# Patient Record
Sex: MALE | NOT HISPANIC OR LATINO | Employment: FULL TIME | URBAN - METROPOLITAN AREA
[De-identification: names, ages, dates, MRNs, and addresses within clinical notes are randomized per-mention and may not be internally consistent; named-entity substitution may affect disease eponyms.]

---

## 2017-12-15 ENCOUNTER — OFFICE VISIT (OUTPATIENT)
Dept: FAMILY MEDICINE CLINIC | Facility: CLINIC | Age: 37
End: 2017-12-15

## 2017-12-15 VITALS
WEIGHT: 158.8 LBS | RESPIRATION RATE: 18 BRPM | SYSTOLIC BLOOD PRESSURE: 112 MMHG | BODY MASS INDEX: 22.73 KG/M2 | DIASTOLIC BLOOD PRESSURE: 76 MMHG | HEART RATE: 74 BPM | OXYGEN SATURATION: 99 % | TEMPERATURE: 98.4 F | HEIGHT: 70 IN

## 2017-12-15 DIAGNOSIS — R33.9 URINARY RETENTION: ICD-10-CM

## 2017-12-15 DIAGNOSIS — Z00.00 HEALTHCARE MAINTENANCE: Primary | ICD-10-CM

## 2017-12-15 LAB
ALBUMIN SERPL-MCNC: 4.6 G/DL (ref 3.2–4.8)
ALBUMIN/GLOB SERPL: 1.5 G/DL (ref 1.5–2.5)
ALP SERPL-CCNC: 89 U/L (ref 25–100)
ALT SERPL W P-5'-P-CCNC: 13 U/L (ref 7–40)
ANION GAP SERPL CALCULATED.3IONS-SCNC: 11 MMOL/L (ref 3–11)
ARTICHOKE IGE QN: 93 MG/DL (ref 0–130)
AST SERPL-CCNC: 20 U/L (ref 0–33)
BASOPHILS # BLD AUTO: 0.05 10*3/MM3 (ref 0–0.2)
BASOPHILS NFR BLD AUTO: 0.9 % (ref 0–1)
BILIRUB BLD-MCNC: NEGATIVE MG/DL
BILIRUB SERPL-MCNC: 0.4 MG/DL (ref 0.3–1.2)
BUN BLD-MCNC: 16 MG/DL (ref 9–23)
BUN/CREAT SERPL: 22.9 (ref 7–25)
CALCIUM SPEC-SCNC: 9.9 MG/DL (ref 8.7–10.4)
CHLORIDE SERPL-SCNC: 100 MMOL/L (ref 99–109)
CHOLEST SERPL-MCNC: 138 MG/DL (ref 0–200)
CLARITY, POC: CLEAR
CO2 SERPL-SCNC: 30 MMOL/L (ref 20–31)
COLOR UR: YELLOW
CREAT BLD-MCNC: 0.7 MG/DL (ref 0.6–1.3)
CRP SERPL-MCNC: 0.04 MG/DL (ref 0–1)
DEPRECATED RDW RBC AUTO: 44.8 FL (ref 37–54)
EOSINOPHIL # BLD AUTO: 0.04 10*3/MM3 (ref 0–0.3)
EOSINOPHIL NFR BLD AUTO: 0.7 % (ref 0–3)
ERYTHROCYTE [DISTWIDTH] IN BLOOD BY AUTOMATED COUNT: 13.2 % (ref 11.3–14.5)
EXPIRATION DATE: ABNORMAL
GFR SERPL CREATININE-BSD FRML MDRD: 128 ML/MIN/1.73
GFR SERPL CREATININE-BSD FRML MDRD: >150 ML/MIN/1.73
GLOBULIN UR ELPH-MCNC: 3.1 GM/DL
GLUCOSE BLD-MCNC: 66 MG/DL (ref 70–100)
GLUCOSE UR STRIP-MCNC: NEGATIVE MG/DL
HCT VFR BLD AUTO: 41.3 % (ref 38.9–50.9)
HDLC SERPL-MCNC: 37 MG/DL (ref 40–60)
HGB BLD-MCNC: 13.4 G/DL (ref 13.1–17.5)
HIV1+2 AB SER QL: NORMAL
IMM GRANULOCYTES # BLD: 0.01 10*3/MM3 (ref 0–0.03)
IMM GRANULOCYTES NFR BLD: 0.2 % (ref 0–0.6)
KETONES UR QL: NEGATIVE
LEUKOCYTE EST, POC: ABNORMAL
LYMPHOCYTES # BLD AUTO: 2 10*3/MM3 (ref 0.6–4.8)
LYMPHOCYTES NFR BLD AUTO: 35.7 % (ref 24–44)
Lab: ABNORMAL
MCH RBC QN AUTO: 29.9 PG (ref 27–31)
MCHC RBC AUTO-ENTMCNC: 32.4 G/DL (ref 32–36)
MCV RBC AUTO: 92.2 FL (ref 80–99)
MONOCYTES # BLD AUTO: 0.46 10*3/MM3 (ref 0–1)
MONOCYTES NFR BLD AUTO: 8.2 % (ref 0–12)
NEUTROPHILS # BLD AUTO: 3.05 10*3/MM3 (ref 1.5–8.3)
NEUTROPHILS NFR BLD AUTO: 54.3 % (ref 41–71)
NITRITE UR-MCNC: POSITIVE MG/ML
PH UR: 6 [PH] (ref 5–8)
PLATELET # BLD AUTO: 214 10*3/MM3 (ref 150–450)
PMV BLD AUTO: 12.2 FL (ref 6–12)
POTASSIUM BLD-SCNC: 4.2 MMOL/L (ref 3.5–5.5)
PROT SERPL-MCNC: 7.7 G/DL (ref 5.7–8.2)
PROT UR STRIP-MCNC: NEGATIVE MG/DL
RBC # BLD AUTO: 4.48 10*6/MM3 (ref 4.2–5.76)
RBC # UR STRIP: ABNORMAL /UL
SODIUM BLD-SCNC: 141 MMOL/L (ref 132–146)
SP GR UR: 1.02 (ref 1–1.03)
TRIGL SERPL-MCNC: 197 MG/DL (ref 0–150)
TSH SERPL DL<=0.05 MIU/L-ACNC: 1.65 MIU/ML (ref 0.35–5.35)
URATE SERPL-MCNC: 4.9 MG/DL (ref 3.7–9.2)
UROBILINOGEN UR QL: NORMAL
WBC NRBC COR # BLD: 5.61 10*3/MM3 (ref 3.5–10.8)

## 2017-12-15 PROCEDURE — 85025 COMPLETE CBC W/AUTO DIFF WBC: CPT | Performed by: FAMILY MEDICINE

## 2017-12-15 PROCEDURE — 90715 TDAP VACCINE 7 YRS/> IM: CPT | Performed by: FAMILY MEDICINE

## 2017-12-15 PROCEDURE — 84443 ASSAY THYROID STIM HORMONE: CPT | Performed by: FAMILY MEDICINE

## 2017-12-15 PROCEDURE — 90471 IMMUNIZATION ADMIN: CPT | Performed by: FAMILY MEDICINE

## 2017-12-15 PROCEDURE — 84550 ASSAY OF BLOOD/URIC ACID: CPT | Performed by: FAMILY MEDICINE

## 2017-12-15 PROCEDURE — 81003 URINALYSIS AUTO W/O SCOPE: CPT | Performed by: FAMILY MEDICINE

## 2017-12-15 PROCEDURE — 86140 C-REACTIVE PROTEIN: CPT | Performed by: FAMILY MEDICINE

## 2017-12-15 PROCEDURE — 90686 IIV4 VACC NO PRSV 0.5 ML IM: CPT | Performed by: FAMILY MEDICINE

## 2017-12-15 PROCEDURE — 80053 COMPREHEN METABOLIC PANEL: CPT | Performed by: FAMILY MEDICINE

## 2017-12-15 PROCEDURE — 90472 IMMUNIZATION ADMIN EACH ADD: CPT | Performed by: FAMILY MEDICINE

## 2017-12-15 PROCEDURE — G0432 EIA HIV-1/HIV-2 SCREEN: HCPCS | Performed by: FAMILY MEDICINE

## 2017-12-15 PROCEDURE — 99395 PREV VISIT EST AGE 18-39: CPT | Performed by: FAMILY MEDICINE

## 2017-12-15 PROCEDURE — 80061 LIPID PANEL: CPT | Performed by: FAMILY MEDICINE

## 2017-12-15 NOTE — PROGRESS NOTES
Subjective   Licha Dooley is a 37 y.o. male.     History of Present Illness   He is here for his annual fasting wellness evaluation.  He is amendable to updating his immunizations.  He is requesting another referral to a local Urologist.    Review of Systems   Constitutional: Negative.    HENT: Negative.    Eyes: Negative.    Respiratory: Negative.    Cardiovascular: Negative.    Gastrointestinal: Negative.    Endocrine: Negative.    Genitourinary: Positive for difficulty urinating.   Musculoskeletal: Negative.    Skin: Negative.    Allergic/Immunologic: Negative.    Neurological: Negative.    Hematological: Negative.    Psychiatric/Behavioral: Negative.        Objective   Physical Exam   Constitutional: He is oriented to person, place, and time. He appears well-developed and well-nourished. He is cooperative.   HENT:   Head: Normocephalic and atraumatic.   Right Ear: Hearing and external ear normal.   Left Ear: Hearing and external ear normal.   Nose: Nose normal.   Mouth/Throat: Uvula is midline, oropharynx is clear and moist and mucous membranes are normal.   Eyes: Conjunctivae and EOM are normal. Pupils are equal, round, and reactive to light. No scleral icterus.   Neck: Trachea normal and normal range of motion. Neck supple. No JVD present. Carotid bruit is not present. No thyromegaly present.   Cardiovascular: Normal rate, regular rhythm, normal heart sounds and intact distal pulses.    Pulmonary/Chest: Effort normal and breath sounds normal.   Abdominal: Soft. Bowel sounds are normal. There is no hepatosplenomegaly. There is no tenderness.   Musculoskeletal: Normal range of motion.   Lymphadenopathy:     He has no cervical adenopathy.   Neurological: He is alert and oriented to person, place, and time. He has normal strength and normal reflexes. No sensory deficit. Gait normal.   Skin: Skin is warm and dry.   Psychiatric: He has a normal mood and affect. His speech is normal and behavior is normal.  Judgment and thought content normal. Cognition and memory are normal.   Nursing note and vitals reviewed.      Assessment/Plan   Diagnoses and all orders for this visit:    Healthcare maintenance  -     POC Urinalysis Dipstick, Automated  -     Comprehensive Metabolic Panel  -     CBC & Differential  -     Lipid Panel  -     TSH  -     Uric Acid  -     C-reactive Protein  -     HIV-1 / O / 2 Ag / Antibody 4th Generation  -     Tdap Vaccine Greater Than or Equal To 8yo IM  -     Flu Vaccine Quad PF 3YR+  - Vaccine counseling and current VIS is provided for immunizations administered today.    Urinary retention  -     Ambulatory Referral to Urology    The patient is here for a health maintenance visit.  Currently, the patient consumes a healthy diet and has an adequate exercise regimen. Screening lab work is ordered.  Immunizations are updated today.  Advice and education is given regarding nutrition, aerobic exercise, routine dental evaluations, routine eye exams, reproductive health, cardiovascular risk reduction, sunscreen use, self skin examination (annual dermatology evaluations) and seat belt use (general overall safety).  Further recommendations after lab evaluation.  Annual wellness evaluations recommended.

## 2018-06-19 ENCOUNTER — TELEPHONE (OUTPATIENT)
Dept: FAMILY MEDICINE CLINIC | Facility: CLINIC | Age: 38
End: 2018-06-19

## 2018-06-19 NOTE — TELEPHONE ENCOUNTER
----- Message from Eveline Blount Rep sent at 6/19/2018 11:45 AM EDT -----  Regarding: ER FU-UTI  Contact: [phone]  PT WAS SEEN IN THE ER FOR A UTI. HE WAS GIVEN AN ANTIBIOTIC. THE ER CALLED HIM BACK AFTER GETTING HIS URINE CULTURE BACK AND THEY CHANGED HIS ANTIBIOTIC. HE HAS BEEN ON THE NEW RX FOR A COUPLE OF DAYS AND WANTS TO KNOW IF HE SHOULD FOLLOW UP WHILE STILL ON HIS RX OR IF HE SHOULD WAIT TO COME IN?

## 2018-07-09 ENCOUNTER — OFFICE VISIT (OUTPATIENT)
Dept: FAMILY MEDICINE CLINIC | Facility: CLINIC | Age: 38
End: 2018-07-09

## 2018-07-09 VITALS
RESPIRATION RATE: 16 BRPM | TEMPERATURE: 98.3 F | DIASTOLIC BLOOD PRESSURE: 84 MMHG | HEART RATE: 67 BPM | HEIGHT: 70 IN | WEIGHT: 163 LBS | SYSTOLIC BLOOD PRESSURE: 122 MMHG | OXYGEN SATURATION: 98 % | BODY MASS INDEX: 23.34 KG/M2

## 2018-07-09 DIAGNOSIS — R31.9 HEMATURIA, UNSPECIFIED TYPE: ICD-10-CM

## 2018-07-09 DIAGNOSIS — R30.0 DYSURIA: Primary | ICD-10-CM

## 2018-07-09 DIAGNOSIS — R10.9 FLANK PAIN, ACUTE: ICD-10-CM

## 2018-07-09 PROBLEM — N31.9 NEUROGENIC BLADDER: Status: ACTIVE | Noted: 2018-07-09

## 2018-07-09 LAB
BILIRUB BLD-MCNC: NEGATIVE MG/DL
CLARITY, POC: CLEAR
COLOR UR: YELLOW
GLUCOSE UR STRIP-MCNC: NEGATIVE MG/DL
HCT VFR BLDA CALC: 38.1 %
HGB BLDA-MCNC: 12.7 G/DL
KETONES UR QL: NEGATIVE
LEUKOCYTE EST, POC: NEGATIVE
MCH, POC: 30.1
MCHC, POC: 33.3
MCV, POC: 90.4
NITRITE UR-MCNC: NEGATIVE MG/ML
PH UR: 5.5 [PH] (ref 5–8)
PLATELET # BLD: 190 10*3/MM3
PMV BLD: 9 FL
PROT UR STRIP-MCNC: NEGATIVE MG/DL
RBC # UR STRIP: ABNORMAL /UL
RBC, POC: 4.22
RDW, POC: 12.8
SP GR UR: 1.02 (ref 1–1.03)
UROBILINOGEN UR QL: NORMAL
WBC # BLD: 6.4 10*3/UL

## 2018-07-09 PROCEDURE — 87185 SC STD ENZYME DETCJ PER NZM: CPT | Performed by: NURSE PRACTITIONER

## 2018-07-09 PROCEDURE — 87086 URINE CULTURE/COLONY COUNT: CPT | Performed by: NURSE PRACTITIONER

## 2018-07-09 PROCEDURE — 99214 OFFICE O/P EST MOD 30 MIN: CPT | Performed by: NURSE PRACTITIONER

## 2018-07-09 PROCEDURE — 85025 COMPLETE CBC W/AUTO DIFF WBC: CPT | Performed by: NURSE PRACTITIONER

## 2018-07-09 PROCEDURE — 87077 CULTURE AEROBIC IDENTIFY: CPT | Performed by: NURSE PRACTITIONER

## 2018-07-09 PROCEDURE — 87186 SC STD MICRODIL/AGAR DIL: CPT | Performed by: NURSE PRACTITIONER

## 2018-07-09 PROCEDURE — 87147 CULTURE TYPE IMMUNOLOGIC: CPT | Performed by: NURSE PRACTITIONER

## 2018-07-09 RX ORDER — LEVOFLOXACIN 500 MG/1
500 TABLET, FILM COATED ORAL DAILY
Qty: 7 TABLET | Refills: 0 | Status: SHIPPED | OUTPATIENT
Start: 2018-07-09 | End: 2018-07-16

## 2018-07-09 NOTE — PATIENT INSTRUCTIONS
Flank Pain, Adult  Flank pain is pain that is located on the side of the body between the upper abdomen and the back. This area is called the flank. The pain may occur over a short period of time (acute), or it may be long-term or recurring (chronic). It may be mild or severe. Flank pain can be caused by many things, including:  · Muscle soreness or injury.  · Kidney stones or kidney disease.  · Stress.  · A disease of the spine (vertebral disk disease).  · A lung infection (pneumonia).  · Fluid around the lungs (pulmonary edema).  · A skin rash caused by the chickenpox virus (shingles).  · Tumors that affect the back of the abdomen.  · Gallbladder disease.    Follow these instructions at home:  · Drink enough fluid to keep your urine clear or pale yellow.  · Rest as told by your health care provider.  · Take over-the-counter and prescription medicines only as told by your health care provider.  · Keep a journal to track what has caused your flank pain and what has made it feel better.  · Keep all follow-up visits as told by your health care provider. This is important.  Contact a health care provider if:  · Your pain is not controlled with medicine.  · You have new symptoms.  · Your pain gets worse.  · You have a fever.  · Your symptoms last longer than 2-3 days.  · You have trouble urinating or you are urinating very frequently.  Get help right away if:  · You have trouble breathing or you are short of breath.  · Your abdomen hurts or it is swollen or red.  · You have nausea or vomiting.  · You feel faint or you pass out.  · You have blood in your urine.  Summary  · Flank pain is pain that is located on the side of the body between the upper abdomen and the back.  · The pain may occur over a short period of time (acute), or it may be long-term or recurring (chronic). It may be mild or severe.  · Flank pain can be caused by many things.  · Contact your health care provider if your symptoms get worse or they last  longer than 2-3 days.  This information is not intended to replace advice given to you by your health care provider. Make sure you discuss any questions you have with your health care provider.  Document Released: 02/08/2007 Document Revised: 03/02/2018 Document Reviewed: 03/02/2018  [employer] Interactive Patient Education © 2018 [employer]  Dysuria  Dysuria is pain or discomfort while urinating. The pain or discomfort may be felt in the tube that carries urine out of the bladder (urethra) or in the surrounding tissue of the genitals. The pain may also be felt in the groin area, lower abdomen, and lower back. You may have to urinate frequently or have the sudden feeling that you have to urinate (urgency). Dysuria can affect both men and women, but is more common in women.  Dysuria can be caused by many different things, including:  · Urinary tract infection in women.  · Infection of the kidney or bladder.  · Kidney stones or bladder stones.  · Certain sexually transmitted infections (STIs), such as chlamydia.  · Dehydration.  · Inflammation of the vagina.  · Use of certain medicines.  · Use of certain soaps or scented products that cause irritation.    Follow these instructions at home:  Watch your dysuria for any changes. The following actions may help to reduce any discomfort you are feeling:  · Drink enough fluid to keep your urine clear or pale yellow.  · Empty your bladder often. Avoid holding urine for long periods of time.  · After a bowel movement or urination, women should cleanse from front to back, using each tissue only once.  · Empty your bladder after sexual intercourse.  · Take medicines only as directed by your health care provider.  · If you were prescribed an antibiotic medicine, finish it all even if you start to feel better.  · Avoid caffeine, tea, and alcohol. They can irritate the bladder and make dysuria worse. In men, alcohol may irritate the prostate.  · Keep all follow-up visits as  directed by your health care provider. This is important.  · If you had any tests done to find the cause of dysuria, it is your responsibility to obtain your test results. Ask the lab or department performing the test when and how you will get your results. Talk with your health care provider if you have any questions about your results.    Contact a health care provider if:  · You develop pain in your back or sides.  · You have a fever.  · You have nausea or vomiting.  · You have blood in your urine.  · You are not urinating as often as you usually do.  Get help right away if:  · You pain is severe and not relieved with medicines.  · You are unable to hold down any fluids.  · You or someone else notices a change in your mental function.  · You have a rapid heartbeat at rest.  · You have shaking or chills.  · You feel extremely weak.  This information is not intended to replace advice given to you by your health care provider. Make sure you discuss any questions you have with your health care provider.  Document Released: 09/15/2005 Document Revised: 05/25/2017 Document Reviewed: 08/13/2015  [employer] Interactive Patient Education © 2018 [employer]    Hematuria, Adult  Hematuria is blood in your urine. It can be caused by a bladder infection, kidney infection, prostate infection, kidney stone, or cancer of your urinary tract. Infections can usually be treated with medicine, and a kidney stone usually will pass through your urine. If neither of these is the cause of your hematuria, further workup to find out the reason may be needed.  It is very important that you tell your health care provider about any blood you see in your urine, even if the blood stops without treatment or happens without causing pain. Blood in your urine that happens and then stops and then happens again can be a symptom of a very serious condition. Also, pain is not a symptom in the initial stages of many urinary cancers.  Follow these  instructions at home:  · Drink lots of fluid, 3-4 quarts a day. If you have been diagnosed with an infection, cranberry juice is especially recommended, in addition to large amounts of water.  · Avoid caffeine, tea, and carbonated beverages because they tend to irritate the bladder.  · Avoid alcohol because it may irritate the prostate.  · Take all medicines as directed by your health care provider.  · If you were prescribed an antibiotic medicine, finish it all even if you start to feel better.  · If you have been diagnosed with a kidney stone, follow your health care provider's instructions regarding straining your urine to catch the stone.  · Empty your bladder often. Avoid holding urine for long periods of time.  · After a bowel movement, women should cleanse front to back. Use each tissue only once.  · Empty your bladder before and after sexual intercourse if you are a female.  Contact a health care provider if:  · You develop back pain.  · You have a fever.  · You have a feeling of sickness in your stomach (nausea) or vomiting.  · Your symptoms are not better in 3 days. Return sooner if you are getting worse.  Get help right away if:  · You develop severe vomiting and are unable to keep the medicine down.  · You develop severe back or abdominal pain despite taking your medicines.  · You begin passing a large amount of blood or clots in your urine.  · You feel extremely weak or faint, or you pass out.  This information is not intended to replace advice given to you by your health care provider. Make sure you discuss any questions you have with your health care provider.  Document Released: 12/18/2006 Document Revised: 05/25/2017 Document Reviewed: 08/18/2014  [employer] Interactive Patient Education © 2017 [employer]    Urinary Tract Infection, Adult  A urinary tract infection (UTI) is an infection of any part of the urinary tract, which includes the kidneys, ureters, bladder, and urethra. These organs make,  store, and get rid of urine in the body. UTI can be a bladder infection (cystitis) or kidney infection (pyelonephritis).  What are the causes?  This infection may be caused by fungi, viruses, or bacteria. Bacteria are the most common cause of UTIs. This condition can also be caused by repeated incomplete emptying of the bladder during urination.  What increases the risk?  This condition is more likely to develop if:  · You ignore your need to urinate or hold urine for long periods of time.  · You do not empty your bladder completely during urination.  · You wipe back to front after urinating or having a bowel movement, if you are female.  · You are uncircumcised, if you are male.  · You are constipated.  · You have a urinary catheter that stays in place (indwelling).  · You have a weak defense (immune) system.  · You have a medical condition that affects your bowels, kidneys, or bladder.  · You have diabetes.  · You take antibiotic medicines frequently or for long periods of time, and the antibiotics no longer work well against certain types of infections (antibiotic resistance).  · You take medicines that irritate your urinary tract.  · You are exposed to chemicals that irritate your urinary tract.  · You are female.    What are the signs or symptoms?  Symptoms of this condition include:  · Fever.  · Frequent urination or passing small amounts of urine frequently.  · Needing to urinate urgently.  · Pain or burning with urination.  · Urine that smells bad or unusual.  · Cloudy urine.  · Pain in the lower abdomen or back.  · Trouble urinating.  · Blood in the urine.  · Vomiting or being less hungry than normal.  · Diarrhea or abdominal pain.  · Vaginal discharge, if you are female.    How is this diagnosed?  This condition is diagnosed with a medical history and physical exam. You will also need to provide a urine sample to test your urine. Other tests may be done, including:  · Blood tests.  · Sexually transmitted  disease (STD) testing.    If you have had more than one UTI, a cystoscopy or imaging studies may be done to determine the cause of the infections.  How is this treated?  Treatment for this condition often includes a combination of two or more of the following:  · Antibiotic medicine.  · Other medicines to treat less common causes of UTI.  · Over-the-counter medicines to treat pain.  · Drinking enough water to stay hydrated.    Follow these instructions at home:  · Take over-the-counter and prescription medicines only as told by your health care provider.  · If you were prescribed an antibiotic, take it as told by your health care provider. Do not stop taking the antibiotic even if you start to feel better.  · Avoid alcohol, caffeine, tea, and carbonated beverages. They can irritate your bladder.  · Drink enough fluid to keep your urine clear or pale yellow.  · Keep all follow-up visits as told by your health care provider. This is important.  · Make sure to:  ? Empty your bladder often and completely. Do not hold urine for long periods of time.  ? Empty your bladder before and after sex.  ? Wipe from front to back after a bowel movement if you are female. Use each tissue one time when you wipe.  Contact a health care provider if:  · You have back pain.  · You have a fever.  · You feel nauseous or vomit.  · Your symptoms do not get better after 3 days.  · Your symptoms go away and then return.  Get help right away if:  · You have severe back pain or lower abdominal pain.  · You are vomiting and cannot keep down any medicines or water.  This information is not intended to replace advice given to you by your health care provider. Make sure you discuss any questions you have with your health care provider.  Document Released: 09/27/2006 Document Revised: 05/31/2017 Document Reviewed: 11/07/2016  [employer] Interactive Patient Education © 2018 [employer]  Levofloxacin tablets  What is this medicine?  LEVOFLOXACIN (hadley  voe FLOX a sin) is a quinolone antibiotic. It is used to treat certain kinds of bacterial infections. It will not work for colds, flu, or other viral infections.  This medicine may be used for other purposes; ask your health care provider or pharmacist if you have questions.  COMMON BRAND NAME(S): Levaquin, Levaquin Leva-Javier  What should I tell my health care provider before I take this medicine?  They need to know if you have any of these conditions:  -bone problems  -diabetes  -history of low levels of potassium in the blood  -irregular heartbeat  -joint problems  -kidney disease  -liver disease  -myasthenia gravis  -seizures  -tendon problems  -tingling of the fingers or toes, or other nerve disorder  -an unusual or allergic reaction to levofloxacin, other quinolone antibiotics, foods, dyes, or preservatives  -pregnant or trying to get pregnant  -breast-feeding  How should I use this medicine?  Take this medicine by mouth with a full glass of water. Follow the directions on the prescription label. This medicine can be taken with or without food. Take your medicine at regular intervals. Do not take your medicine more often than directed. Do not skip doses or stop your medicine early even if you feel better. Do not stop taking except on your doctor's advice.  A special MedGuide will be given to you by the pharmacist with each prescription and refill. Be sure to read this information carefully each time.  Talk to your pediatrician regarding the use of this medicine in children. While this drug may be prescribed for children as young as 6 months for selected conditions, precautions do apply.  Overdosage: If you think you have taken too much of this medicine contact a poison control center or emergency room at once.  NOTE: This medicine is only for you. Do not share this medicine with others.  What if I miss a dose?  If you miss a dose, take it as soon as you remember. If it is almost time for your next dose, take  only that dose. Do not take double or extra doses.  What may interact with this medicine?  Do not take this medicine with any of the following medications:  -bepridil  -certain medicines for depression, anxiety, or psychotic disturbances like pimozide, thioridazine, and ziprasidone  -certain medicines for irregular heart beat like dofetilide and dronedarone  -cisapride  -halofantrine  This medicine may also interact with the following medications:  -antacids  -birth control pills  -certain medicines for diabetes, like glipizide, glyburide, or insulin  -didanosine buffered tablets or powder  -multivitamins  -NSAIDS, medicines for pain and inflammation, like ibuprofen or naproxen  -steroid medicines like prednisone or cortisone  -sucralfate  -theophylline  -warfarin  This list may not describe all possible interactions. Give your health care provider a list of all the medicines, herbs, non-prescription drugs, or dietary supplements you use. Also tell them if you smoke, drink alcohol, or use illegal drugs. Some items may interact with your medicine.  What should I watch for while using this medicine?  Tell your doctor or healthcare professional if your symptoms do not start to get better or if they get worse.  Do not treat diarrhea with over the counter products. Contact your doctor if you have diarrhea that lasts more than 2 days or if it is severe and watery.  Check with your doctor or health care professional if you get an attack of severe diarrhea, nausea and vomiting, or if you sweat a lot. The loss of too much body fluid can make it dangerous for you to take this medicine.  You may get drowsy or dizzy. Do not drive, use machinery, or do anything that needs mental alertness until you know how this medicine affects you. Do not sit or stand up quickly, especially if you are an older patient. This reduces the risk of dizzy or fainting spells.  This medicine can make you more sensitive to the sun. Keep out of the sun.  If you cannot avoid being in the sun, wear protective clothing and use a sunscreen. Do not use sun lamps or tanning beds/booths. Contact your doctor if you get a sunburn.  If you are a diabetic monitor your blood glucose carefully. If you get an unusual reading stop taking this medicine and call your doctor right away.  Avoid antacids, calcium, iron, and zinc products for 2 hours before and 2 hours after taking a dose of this medicine.  What side effects may I notice from receiving this medicine?  Side effects that you should report to your doctor or health care professional as soon as possible:  -allergic reactions like skin rash or hives, swelling of the face, lips, or tongue  -anxious  -breathing problems  -confusion  -depressed mood  -diarrhea  -dizziness  -fast, irregular heartbeat  -hallucination, loss of contact with reality  -joint, muscle, or tendon pain or swelling  -muscle weakness  -pain, tingling, numbness in the hands or feet  -seizures  -signs and symptoms of high blood sugar such as dizziness; dry mouth; dry skin; fruity breath; nausea; stomach pain; increased hunger or thirst; increased urination  -signs and symptoms of liver injury like dark yellow or brown urine; general ill feeling or flu-like symptoms; light-colored stools; loss of appetite; nausea; right upper belly pain; unusually weak or tired; yellowing of the eyes or skin  -signs and symptoms of low blood sugar such as feeling anxious; confusion; dizziness; increased hunger; unusually weak or tired; sweating; shakiness; cold; irritable; headache; blurred vision; fast heartbeat; loss of consciousness  -suicidal thoughts or other mood changes  -sunburn  -unusually weak or tired  Side effects that usually do not require medical attention (report to your doctor or health care professional if they continue or are bothersome):  -constipation  -dry mouth  -headache  -nausea, vomiting  -trouble sleeping  This list may not describe all possible side  effects. Call your doctor for medical advice about side effects. You may report side effects to FDA at 1-200-HHN-3447.  Where should I keep my medicine?  Keep out of the reach of children.  Store at room temperature between 15 and 30 degrees C (59 and 86 degrees F). Keep in a tightly closed container. Throw away any unused medicine after the expiration date.  NOTE: This sheet is a summary. It may not cover all possible information. If you have questions about this medicine, talk to your doctor, pharmacist, or health care provider.  © 2018 [employer] (2017-06-27 12:38:27)

## 2018-07-11 NOTE — PROGRESS NOTES
Subjective   Licha Dooley is a 38 y.o. male.     Difficulty Urinating   This is a new problem. The current episode started yesterday. The problem occurs daily. The problem has been gradually worsening. Associated symptoms include urinary symptoms. Pertinent negatives include no abdominal pain, anorexia, arthralgias, change in bowel habit, chest pain, chills, diaphoresis, fever, myalgias, nausea, rash or vomiting. Exacerbated by: Patient self-catheterizes due to neurogenic bladder. Treatments tried: Patient states that he has recently been treated for UTI's with two different antibiotics in the past 3 weeks ( ER)-not certain of exact names of medications, thinks may be Bactrim and Macrobid. The treatment provided mild relief.       The following portions of the patient's history were reviewed and updated as appropriate: allergies, current medications, past family history, past medical history, past social history, past surgical history and problem list.     No Known Allergies     Review of Systems   Constitutional: Negative for chills, diaphoresis and fever.   Respiratory: Negative.    Cardiovascular: Negative.  Negative for chest pain.   Gastrointestinal: Negative for abdominal pain, anorexia, change in bowel habit, diarrhea, nausea and vomiting.   Genitourinary: Positive for difficulty urinating, dysuria and flank pain (right). Negative for hematuria, penile pain and testicular pain.   Musculoskeletal: Negative for arthralgias and myalgias.   Skin: Negative for rash.   Hematological: Negative.    Psychiatric/Behavioral: Negative.        Objective   Physical Exam   Constitutional: He is oriented to person, place, and time. Vital signs are normal. He appears well-developed and well-nourished. He is cooperative. No distress.   HENT:   Mouth/Throat: Uvula is midline and mucous membranes are normal.   Cardiovascular: Normal rate, regular rhythm and normal heart sounds.    Pulmonary/Chest: Effort normal and  breath sounds normal.   Abdominal: Soft. Normal appearance. There is no tenderness. There is no rigidity, no rebound, no guarding and no CVA tenderness.   Neurological: He is alert and oriented to person, place, and time.   Skin: Skin is warm, dry and intact. No rash noted. He is not diaphoretic.   Psychiatric: He has a normal mood and affect. His behavior is normal. Judgment and thought content normal.   Vitals reviewed.    Vitals:    07/09/18 1608   BP: 122/84   Pulse: 67   Resp: 16   Temp: 98.3 °F (36.8 °C)   SpO2: 98%   Body mass index is 23.39 kg/m².     Assessment/Plan   Licha was seen today for urinary tract infection.    Diagnoses and all orders for this visit:    Dysuria  -     POCT urinalysis dipstick, automated  -     POC CBC With / Auto Diff  -     levoFLOXacin (LEVAQUIN) 500 MG tablet; Take 1 tablet by mouth Daily for 7 days.  -     Urine Culture - Urine, Urine, Clean Catch    Hematuria, unspecified type  -     levoFLOXacin (LEVAQUIN) 500 MG tablet; Take 1 tablet by mouth Daily for 7 days.  -     Urine Culture - Urine, Urine, Clean Catch    Flank pain, acute  -     levoFLOXacin (LEVAQUIN) 500 MG tablet; Take 1 tablet by mouth Daily for 7 days.  -     Urine Culture - Urine, Urine, Clean Catch       Brief Urine Lab Results  (Last result in the past 365 days)      Color   Clarity   Blood   Leuk Est   Nitrite   Protein   CREAT   Urine HCG        07/09/18 1631 Yellow Clear Trace(A) Negative Negative Negative             POC WBC 6.4    Keep upcoming appointment with  Urology.  Discussed the nature of the medical condition(s) risks, complications, implications, management, safe and proper use of medications. Encouraged medication compliance, and keeping scheduled follow up appointments with me and any other providers.

## 2018-07-13 LAB — BACTERIA SPEC AEROBE CULT: ABNORMAL

## 2018-07-14 ENCOUNTER — TELEPHONE (OUTPATIENT)
Dept: FAMILY MEDICINE CLINIC | Facility: CLINIC | Age: 38
End: 2018-07-14

## 2018-07-15 ENCOUNTER — TELEPHONE (OUTPATIENT)
Dept: FAMILY MEDICINE CLINIC | Facility: CLINIC | Age: 38
End: 2018-07-15

## 2018-07-15 NOTE — TELEPHONE ENCOUNTER
Spoke with patient and informed of urine culture results-organism is susceptible to Levaquin patient prescribed.

## 2018-07-25 ENCOUNTER — OFFICE VISIT (OUTPATIENT)
Dept: FAMILY MEDICINE CLINIC | Facility: CLINIC | Age: 38
End: 2018-07-25

## 2018-07-25 VITALS
OXYGEN SATURATION: 98 % | TEMPERATURE: 98.3 F | DIASTOLIC BLOOD PRESSURE: 62 MMHG | WEIGHT: 164 LBS | HEIGHT: 70 IN | RESPIRATION RATE: 16 BRPM | SYSTOLIC BLOOD PRESSURE: 116 MMHG | BODY MASS INDEX: 23.48 KG/M2 | HEART RATE: 61 BPM

## 2018-07-25 DIAGNOSIS — N31.9 NEUROGENIC BLADDER: ICD-10-CM

## 2018-07-25 DIAGNOSIS — N39.0 ACUTE UTI: Primary | ICD-10-CM

## 2018-07-25 LAB
BILIRUB BLD-MCNC: NEGATIVE MG/DL
CLARITY, POC: CLEAR
COLOR UR: YELLOW
GLUCOSE UR STRIP-MCNC: NEGATIVE MG/DL
KETONES UR QL: NEGATIVE
LEUKOCYTE EST, POC: ABNORMAL
NITRITE UR-MCNC: NEGATIVE MG/ML
PH UR: 7 [PH] (ref 5–8)
PROT UR STRIP-MCNC: NEGATIVE MG/DL
RBC # UR STRIP: NEGATIVE /UL
SP GR UR: 1.03 (ref 1–1.03)
UROBILINOGEN UR QL: NORMAL

## 2018-07-25 PROCEDURE — 99214 OFFICE O/P EST MOD 30 MIN: CPT | Performed by: FAMILY MEDICINE

## 2018-07-25 RX ORDER — SULFAMETHOXAZOLE AND TRIMETHOPRIM 800; 160 MG/1; MG/1
1 TABLET ORAL 2 TIMES DAILY
Qty: 20 TABLET | Refills: 0 | Status: SHIPPED | OUTPATIENT
Start: 2018-07-25 | End: 2018-08-06

## 2018-07-25 RX ORDER — OMEPRAZOLE 20 MG/1
20 CAPSULE, DELAYED RELEASE ORAL DAILY
COMMUNITY

## 2018-07-25 NOTE — PROGRESS NOTES
"Gagan Dooley is a 38 y.o. male.     History of Present Illness   The patient is here today to follow up on UTI and Flank pain.    States he does self craterization 2-3 times a day.  States he has had 3 rd time in 5 weeks he has had a UTI.  Last antibiotic was Levofloxacin 500 mg daily for 7 days.    Saw Urology at  3 months ago.    Denies any chest pain or shortness of breath.    The following portions of the patient's history were reviewed and updated as appropriate: allergies, current medications, past social history and problem list.    Review of Systems   Respiratory: Negative for shortness of breath.    Cardiovascular: Negative for chest pain.   Genitourinary: Positive for flank pain.       Objective   /62   Pulse 61   Temp 98.3 °F (36.8 °C) (Temporal Artery )   Resp 16   Ht 177.8 cm (70\")   Wt 74.4 kg (164 lb)   SpO2 98%   BMI 23.53 kg/m²   Physical Exam   Constitutional: He appears well-developed and well-nourished.   Cardiovascular: Normal rate and regular rhythm.    Pulmonary/Chest: Effort normal and breath sounds normal.   Abdominal: Soft. Bowel sounds are normal. He exhibits no distension. There is no tenderness.   Nursing note and vitals reviewed.      Assessment/Plan   Problem List Items Addressed This Visit        Genitourinary    Neurogenic bladder      Other Visit Diagnoses     Acute UTI    -  Primary    Relevant Orders    POCT urinalysis dipstick, automated (Completed)      The patient has been seen by the urology Department at the [facility].  He needs follow-up with them.  His urine culture from his visit at the last time here revealed sensitivity to trimethoprim and sulfamethoxazole.  We will treat him for a 10 day period with that medication.        Drink plenty fluids.    Continue medications as doing.    Rx for Bactrim DS twice a day for 10 days. #20+0.  Take OTC Probiotic daily while taking the antibiotics.    Follow up as " needed.              Scribed for Dr Juaquin Albrecht by Willa Taylor CMA.          I, Juaquin Albrecht MD, personally performed the services described in this documentation, as scribed by Willa Taylor in my presence, and is both accurate and complete.

## 2018-08-06 ENCOUNTER — OFFICE VISIT (OUTPATIENT)
Dept: FAMILY MEDICINE CLINIC | Facility: CLINIC | Age: 38
End: 2018-08-06

## 2018-08-06 VITALS
HEIGHT: 70 IN | SYSTOLIC BLOOD PRESSURE: 118 MMHG | RESPIRATION RATE: 16 BRPM | OXYGEN SATURATION: 98 % | DIASTOLIC BLOOD PRESSURE: 74 MMHG | WEIGHT: 164.6 LBS | HEART RATE: 67 BPM | BODY MASS INDEX: 23.56 KG/M2

## 2018-08-06 DIAGNOSIS — R39.198 DIFFICULTY URINATING: Primary | ICD-10-CM

## 2018-08-06 LAB
BILIRUB BLD-MCNC: NEGATIVE MG/DL
CLARITY, POC: CLEAR
COLOR UR: YELLOW
GLUCOSE UR STRIP-MCNC: NEGATIVE MG/DL
KETONES UR QL: NEGATIVE
LEUKOCYTE EST, POC: NEGATIVE
NITRITE UR-MCNC: NEGATIVE MG/ML
PH UR: 5 [PH] (ref 5–8)
PROT UR STRIP-MCNC: NEGATIVE MG/DL
RBC # UR STRIP: NEGATIVE /UL
SP GR UR: 1.01 (ref 1–1.03)
UROBILINOGEN UR QL: NORMAL

## 2018-08-06 PROCEDURE — 99213 OFFICE O/P EST LOW 20 MIN: CPT | Performed by: FAMILY MEDICINE

## 2018-08-06 PROCEDURE — 87086 URINE CULTURE/COLONY COUNT: CPT | Performed by: FAMILY MEDICINE

## 2018-08-06 NOTE — PROGRESS NOTES
Subjective   Licha Dooley is a 38 y.o. male.     History of Present Illness   He continues care with his urologist at [facility].  He continues with intermittent self caths.  He describes difficulty with  urinating over the past month.  He is requesting a urine evaluation.  His recent urine culture grew out staph epidermidis.  He was prescribed Levaquin.  He was seen again on July 25 and treated with Bactrim.  He denies fever, chills, flank pain or gross hematuria.    Review of Systems   Constitutional: Negative for chills and fever.   Genitourinary: Positive for difficulty urinating.   Skin: Negative for rash.       Objective   Physical Exam   Constitutional: He is oriented to person, place, and time. He appears well-developed and well-nourished.   HENT:   Head: Normocephalic and atraumatic.   Right Ear: Hearing normal.   Left Ear: Hearing normal.   Mouth/Throat: Mucous membranes are normal.   Eyes: Pupils are equal, round, and reactive to light. Conjunctivae and EOM are normal.   Neck: Neck supple. No JVD present. No thyromegaly present.   Cardiovascular: Normal rate, regular rhythm and normal heart sounds.    Pulmonary/Chest: Effort normal and breath sounds normal.   Musculoskeletal: Normal range of motion.   Neurological: He is alert and oriented to person, place, and time. He has normal strength. No sensory deficit. Gait normal.   Skin: Skin is warm and dry.   Psychiatric: He has a normal mood and affect. His behavior is normal. Judgment and thought content normal. Cognition and memory are normal.   Nursing note and vitals reviewed.      Assessment/Plan   Diagnoses and all orders for this visit:    Difficulty urinating  -     POC Urinalysis Dipstick, Automated  -     Urine Culture - Urine, Clean Catch  - Follow up with urologist  - Further recommendations pending urine culture results.

## 2018-08-08 LAB — BACTERIA SPEC AEROBE CULT: NORMAL

## 2018-10-17 ENCOUNTER — OFFICE VISIT (OUTPATIENT)
Dept: FAMILY MEDICINE CLINIC | Facility: CLINIC | Age: 38
End: 2018-10-17

## 2018-10-17 VITALS
OXYGEN SATURATION: 99 % | DIASTOLIC BLOOD PRESSURE: 72 MMHG | TEMPERATURE: 97.8 F | HEIGHT: 70 IN | HEART RATE: 64 BPM | SYSTOLIC BLOOD PRESSURE: 118 MMHG | RESPIRATION RATE: 17 BRPM | BODY MASS INDEX: 23.13 KG/M2 | WEIGHT: 161.6 LBS

## 2018-10-17 DIAGNOSIS — Z78.9 SELF-CATHETERIZES URINARY BLADDER: ICD-10-CM

## 2018-10-17 DIAGNOSIS — Z23 IMMUNIZATION DUE: ICD-10-CM

## 2018-10-17 DIAGNOSIS — N31.9 NEUROGENIC BLADDER: ICD-10-CM

## 2018-10-17 DIAGNOSIS — R30.0 DYSURIA: Primary | ICD-10-CM

## 2018-10-17 LAB
BILIRUB BLD-MCNC: NEGATIVE MG/DL
CLARITY, POC: CLEAR
COLOR UR: YELLOW
EXPIRATION DATE: NORMAL
GLUCOSE UR STRIP-MCNC: NEGATIVE MG/DL
KETONES UR QL: NEGATIVE
LEUKOCYTE EST, POC: NEGATIVE
Lab: NORMAL
NITRITE UR-MCNC: NEGATIVE MG/ML
PH UR: 7 [PH] (ref 5–8)
PROT UR STRIP-MCNC: NEGATIVE MG/DL
RBC # UR STRIP: NEGATIVE /UL
SP GR UR: 1.01 (ref 1–1.03)
UROBILINOGEN UR QL: NORMAL

## 2018-10-17 PROCEDURE — 90471 IMMUNIZATION ADMIN: CPT | Performed by: NURSE PRACTITIONER

## 2018-10-17 PROCEDURE — 90686 IIV4 VACC NO PRSV 0.5 ML IM: CPT | Performed by: NURSE PRACTITIONER

## 2018-10-17 PROCEDURE — 99213 OFFICE O/P EST LOW 20 MIN: CPT | Performed by: NURSE PRACTITIONER

## 2018-10-17 PROCEDURE — 87086 URINE CULTURE/COLONY COUNT: CPT | Performed by: NURSE PRACTITIONER

## 2018-10-17 NOTE — PROGRESS NOTES
Subjective   Licha Dooley is a 38 y.o. male.     History of Present Illness Patient presents with complaints of right sided low back pain for 1 day. He has a history of uti and this is one of his first symptoms of UTI.  He self caths for neurogenic bladder. Last night had dizziness, flank pain. Denies fever, nausea and vomiting. No hematuria or dysuria. Was able to void a small amount for specimen today.  Also requests flu vacc today.    The following portions of the patient's history were reviewed and updated as appropriate: allergies, current medications, past family history, past medical history, past social history, past surgical history and problem list.    Review of Systems   Constitutional: Negative for appetite change, fever and unexpected weight loss.   HENT: Negative for nosebleeds, sore throat and trouble swallowing.    Eyes: Negative for visual disturbance.   Respiratory: Negative for cough, shortness of breath and wheezing.    Cardiovascular: Negative for chest pain, palpitations and leg swelling.   Gastrointestinal: Negative for abdominal pain, blood in stool, constipation, diarrhea, nausea and vomiting.   Endocrine: Negative for polydipsia, polyphagia and polyuria.   Genitourinary: Positive for flank pain. Negative for urinary incontinence, dysuria, frequency and hematuria.   Musculoskeletal: Negative for arthralgias, gait problem, joint swelling and myalgias.   Skin: Negative for rash.   Neurological: Positive for dizziness. Negative for seizures, syncope and numbness.   Hematological: Negative for adenopathy. Does not bruise/bleed easily.   Psychiatric/Behavioral: Negative for sleep disturbance and depressed mood. The patient is not nervous/anxious.        Objective   Physical Exam   Constitutional: He is oriented to person, place, and time. He appears well-developed and well-nourished. No distress.   HENT:   Head: Normocephalic and atraumatic.   Right Ear: Tympanic membrane and external ear  normal.   Left Ear: Tympanic membrane and external ear normal.   Nose: Nose normal.   Mouth/Throat: Oropharynx is clear and moist. No oropharyngeal exudate.   Eyes: Pupils are equal, round, and reactive to light. Conjunctivae are normal. Right eye exhibits no discharge. Left eye exhibits no discharge. No scleral icterus.   Neck: Neck supple.   Cardiovascular: Normal rate.    Pulmonary/Chest: No respiratory distress. He has no wheezes.   Abdominal: He exhibits no distension and no mass. There is no tenderness.   No cva tenderness   Musculoskeletal: He exhibits no edema or deformity.   Lymphadenopathy:     He has no cervical adenopathy.   Neurological: He is alert and oriented to person, place, and time. Coordination normal.   Skin: Skin is warm and dry. Capillary refill takes less than 2 seconds. No rash noted. No erythema.   Psychiatric: He has a normal mood and affect. His speech is normal and behavior is normal. Judgment and thought content normal.   Nursing note and vitals reviewed.        Assessment/Plan   Licha was seen today for urinary tract infection.    Diagnoses and all orders for this visit:    Dysuria  -     POCT urinalysis dipstick, automated  -     Urine Culture - Urine, Urine, Clean Catch    Immunization due  -     Fluarix/Fluzone/Afluria Quad>6 Months    Neurogenic bladder    Self-catheterizes urinary bladder      Chart reviewed with patient. Last urine had a negative culture. Prior to that has staph aureus.   Will send urine for culture.  Proceed with flu vacc today.  VIS provided.

## 2018-10-19 LAB — BACTERIA SPEC AEROBE CULT: NORMAL

## 2019-01-03 PROCEDURE — 87086 URINE CULTURE/COLONY COUNT: CPT | Performed by: FAMILY MEDICINE

## 2019-01-07 ENCOUNTER — TELEPHONE (OUTPATIENT)
Dept: URGENT CARE | Facility: CLINIC | Age: 39
End: 2019-01-07

## 2019-01-07 NOTE — TELEPHONE ENCOUNTER
Mr. Dooley called and was wanting to know if we had his lab results. I advised him that we did get his urine culture results and it came back just normal charisse. Patient stated that he was going to keep his follow-up appointment with his urologist. Patient had no other questions at this time.

## 2019-11-07 ENCOUNTER — TELEPHONE (OUTPATIENT)
Dept: FAMILY MEDICINE CLINIC | Facility: CLINIC | Age: 39
End: 2019-11-07

## 2019-11-13 ENCOUNTER — OFFICE VISIT (OUTPATIENT)
Dept: FAMILY MEDICINE CLINIC | Facility: CLINIC | Age: 39
End: 2019-11-13

## 2019-11-13 VITALS
HEART RATE: 70 BPM | BODY MASS INDEX: 25.83 KG/M2 | OXYGEN SATURATION: 99 % | WEIGHT: 180.4 LBS | RESPIRATION RATE: 16 BRPM | SYSTOLIC BLOOD PRESSURE: 120 MMHG | HEIGHT: 70 IN | DIASTOLIC BLOOD PRESSURE: 78 MMHG | TEMPERATURE: 98.2 F

## 2019-11-13 DIAGNOSIS — Z00.00 WELL ADULT EXAM: Primary | ICD-10-CM

## 2019-11-13 DIAGNOSIS — N31.9 NEUROGENIC BLADDER: ICD-10-CM

## 2019-11-13 LAB
ALBUMIN SERPL-MCNC: 4.7 G/DL (ref 3.5–5.2)
ALBUMIN/GLOB SERPL: 1.3 G/DL
ALP SERPL-CCNC: 69 U/L (ref 39–117)
ALT SERPL W P-5'-P-CCNC: 12 U/L (ref 1–41)
ANION GAP SERPL CALCULATED.3IONS-SCNC: 10.3 MMOL/L (ref 5–15)
AST SERPL-CCNC: 16 U/L (ref 1–40)
BASOPHILS # BLD AUTO: 0.06 10*3/MM3 (ref 0–0.2)
BASOPHILS NFR BLD AUTO: 1 % (ref 0–1.5)
BILIRUB BLD-MCNC: NEGATIVE MG/DL
BILIRUB SERPL-MCNC: 0.4 MG/DL (ref 0.2–1.2)
BUN BLD-MCNC: 15 MG/DL (ref 6–20)
BUN/CREAT SERPL: 19.7 (ref 7–25)
CALCIUM SPEC-SCNC: 9.8 MG/DL (ref 8.6–10.5)
CHLORIDE SERPL-SCNC: 98 MMOL/L (ref 98–107)
CLARITY, POC: CLEAR
CO2 SERPL-SCNC: 28.7 MMOL/L (ref 22–29)
COLOR UR: YELLOW
CREAT BLD-MCNC: 0.76 MG/DL (ref 0.76–1.27)
DEPRECATED RDW RBC AUTO: 40.3 FL (ref 37–54)
EOSINOPHIL # BLD AUTO: 0.07 10*3/MM3 (ref 0–0.4)
EOSINOPHIL NFR BLD AUTO: 1.2 % (ref 0.3–6.2)
ERYTHROCYTE [DISTWIDTH] IN BLOOD BY AUTOMATED COUNT: 12.5 % (ref 12.3–15.4)
EXPIRATION DATE: ABNORMAL
GFR SERPL CREATININE-BSD FRML MDRD: 115 ML/MIN/1.73
GFR SERPL CREATININE-BSD FRML MDRD: 140 ML/MIN/1.73
GLOBULIN UR ELPH-MCNC: 3.5 GM/DL
GLUCOSE BLD-MCNC: 91 MG/DL (ref 65–99)
GLUCOSE UR STRIP-MCNC: NEGATIVE MG/DL
HCT VFR BLD AUTO: 38.4 % (ref 37.5–51)
HGB BLD-MCNC: 13.5 G/DL (ref 13–17.7)
IMM GRANULOCYTES # BLD AUTO: 0.01 10*3/MM3 (ref 0–0.05)
IMM GRANULOCYTES NFR BLD AUTO: 0.2 % (ref 0–0.5)
KETONES UR QL: NEGATIVE
LEUKOCYTE EST, POC: NEGATIVE
LYMPHOCYTES # BLD AUTO: 2.71 10*3/MM3 (ref 0.7–3.1)
LYMPHOCYTES NFR BLD AUTO: 45.8 % (ref 19.6–45.3)
Lab: ABNORMAL
MCH RBC QN AUTO: 31.1 PG (ref 26.6–33)
MCHC RBC AUTO-ENTMCNC: 35.2 G/DL (ref 31.5–35.7)
MCV RBC AUTO: 88.5 FL (ref 79–97)
MONOCYTES # BLD AUTO: 0.54 10*3/MM3 (ref 0.1–0.9)
MONOCYTES NFR BLD AUTO: 9.1 % (ref 5–12)
NEUTROPHILS # BLD AUTO: 2.53 10*3/MM3 (ref 1.7–7)
NEUTROPHILS NFR BLD AUTO: 42.7 % (ref 42.7–76)
NITRITE UR-MCNC: POSITIVE MG/ML
NRBC BLD AUTO-RTO: 0 /100 WBC (ref 0–0.2)
PH UR: 6 [PH] (ref 5–8)
PLATELET # BLD AUTO: 212 10*3/MM3 (ref 140–450)
PMV BLD AUTO: 12.2 FL (ref 6–12)
POTASSIUM BLD-SCNC: 4.1 MMOL/L (ref 3.5–5.2)
PROT SERPL-MCNC: 8.2 G/DL (ref 6–8.5)
PROT UR STRIP-MCNC: NEGATIVE MG/DL
RBC # BLD AUTO: 4.34 10*6/MM3 (ref 4.14–5.8)
RBC # UR STRIP: ABNORMAL /UL
SODIUM BLD-SCNC: 137 MMOL/L (ref 136–145)
SP GR UR: 1.02 (ref 1–1.03)
TSH SERPL DL<=0.05 MIU/L-ACNC: 2.51 UIU/ML (ref 0.27–4.2)
UROBILINOGEN UR QL: NORMAL
WBC NRBC COR # BLD: 5.92 10*3/MM3 (ref 3.4–10.8)

## 2019-11-13 PROCEDURE — 85025 COMPLETE CBC W/AUTO DIFF WBC: CPT | Performed by: FAMILY MEDICINE

## 2019-11-13 PROCEDURE — 84443 ASSAY THYROID STIM HORMONE: CPT | Performed by: FAMILY MEDICINE

## 2019-11-13 PROCEDURE — 81003 URINALYSIS AUTO W/O SCOPE: CPT | Performed by: FAMILY MEDICINE

## 2019-11-13 PROCEDURE — 87077 CULTURE AEROBIC IDENTIFY: CPT | Performed by: FAMILY MEDICINE

## 2019-11-13 PROCEDURE — 80053 COMPREHEN METABOLIC PANEL: CPT | Performed by: FAMILY MEDICINE

## 2019-11-13 PROCEDURE — 99395 PREV VISIT EST AGE 18-39: CPT | Performed by: FAMILY MEDICINE

## 2019-11-13 PROCEDURE — 87086 URINE CULTURE/COLONY COUNT: CPT | Performed by: FAMILY MEDICINE

## 2019-11-13 PROCEDURE — 87186 SC STD MICRODIL/AGAR DIL: CPT | Performed by: FAMILY MEDICINE

## 2019-11-13 NOTE — PROGRESS NOTES
Licha Dooley is a 39 y.o. male who presents today for Follow-up (bloodwork)      He is here today to receive bloodwork and to continue care of his PMH of neurogenic bladder. He states that his symptoms have waxed and waned since last visit, but overall, he feels well today. He states that he has noticed left sided flank pain for the last two weeks. Occasional stabbing pain. No kidney stones in the past. He has a PMH of recurrent UTIs. He has been followed by urology in recent years and has frequent US performed at that office to monitor bladder status. He denies hematuria, fever, fatigue, nausea or vomiting. He reports frequent self-catheterization and would like to know his risk for nephrolithiasis long term. Patient sees optometrist and dentist regularly. Exercises at least 3 times a week and does a variety of exercise. He eats a healthy Mediterranean diet.          Review of Systems   Constitutional: Negative for appetite change, chills, diaphoresis, fatigue, fever, unexpected weight gain and unexpected weight loss.   HENT: Negative for congestion, dental problem, drooling, ear discharge, ear pain, facial swelling, hearing loss, mouth sores, nosebleeds, postnasal drip, rhinorrhea, sinus pressure, sneezing, sore throat, swollen glands, tinnitus, trouble swallowing and voice change.    Eyes: Negative for blurred vision, double vision, photophobia, pain, discharge, redness, itching and visual disturbance.   Respiratory: Negative for apnea, cough, choking, chest tightness, shortness of breath, wheezing and stridor.    Cardiovascular: Negative for chest pain, palpitations and leg swelling.   Gastrointestinal: Negative for abdominal distention, abdominal pain, anal bleeding, blood in stool, constipation, diarrhea, nausea, rectal pain, vomiting, GERD and indigestion.   Endocrine: Negative for polydipsia, polyphagia and polyuria.   Genitourinary: Positive for flank pain. Negative for decreased urine volume,  "difficulty urinating, discharge, dysuria, genital sores, hematuria, nocturia, penile pain, erectile dysfunction, penile swelling, scrotal swelling, testicular pain, urgency and urinary incontinence.   Musculoskeletal: Negative for arthralgias, back pain, gait problem, joint swelling, myalgias, neck pain, neck stiffness and bursitis.   Skin: Negative for color change, dry skin, pallor, rash, skin lesions and bruise.   Neurological: Negative for dizziness and confusion.        The following portions of the patient's history were reviewed and updated as appropriate: allergies, current medications, past family history, past medical history, past social history, past surgical history and problem list.    Current Outpatient Medications on File Prior to Visit   Medication Sig Dispense Refill   • omeprazole (priLOSEC) 20 MG capsule Take 20 mg by mouth Daily.       No current facility-administered medications on file prior to visit.        No Known Allergies     Visit Vitals  /78   Pulse 70   Temp 98.2 °F (36.8 °C)   Resp 16   Ht 177.8 cm (70\")   Wt 81.8 kg (180 lb 6.4 oz)   SpO2 99%   BMI 25.88 kg/m²        Physical Exam   Constitutional: He is oriented to person, place, and time. He appears well-developed and well-nourished. No distress.   Cardiovascular: Normal rate, regular rhythm, normal heart sounds and intact distal pulses. Exam reveals no friction rub.   No murmur heard.  Pulmonary/Chest: Effort normal and breath sounds normal. No stridor. No respiratory distress. He has no wheezes. He has no rales. He exhibits no tenderness.   Abdominal: Soft. Bowel sounds are normal. He exhibits no distension and no mass. There is no tenderness. There is no rebound and no guarding. No hernia.   No CVA tenderness   Neurological: He is alert and oriented to person, place, and time.   Skin: Skin is warm and dry. Capillary refill takes less than 2 seconds. No rash noted. He is not diaphoretic. No erythema. No pallor. "   Psychiatric: He has a normal mood and affect. His behavior is normal. Judgment and thought content normal.             Problems Addressed this Visit        Genitourinary    Neurogenic bladder     Labs drawn today to assess kidney function.  He also felt like he could possibly getting UTI.  He had no CVA tenderness.  UA MC was collected.         Relevant Orders    Urinalysis With Culture If Indicated - Urine, Catheter In/Out       Other    Well adult exam - Primary     The patient is here for health maintenance visit.  Currently, the patient consumes a healthy diet and has an adequate exercise regimen.  Screening lab work is ordered.  Immunizations were reviewed today.  Advice and education was given regarding nutrition, aerobic exercise, routine dental evaluations, routine eye exams, reproductive health, cardiovascular risk reduction, sunscreen use, self skin examination (annual dermatology evaluations) and seatbelt use (general overall safety).  Further recommendations will be given if needed after lab evaluation.  Annual wellness evaluation is recommended.           Relevant Orders    CBC & Differential    Comprehensive Metabolic Panel    TSH Rfx On Abnormal To Free T4          Return in about 1 year (around 11/13/2020) for Annual.    Parts of this office note have been dictated by voice recognition software. Grammatical and/or spelling errors may be present.    Christopher Rhoades MD   11/13/2019

## 2019-11-13 NOTE — ASSESSMENT & PLAN NOTE
Labs drawn today to assess kidney function.  He also felt like he could possibly getting UTI.  He had no CVA tenderness.  UA MC was collected.

## 2019-11-13 NOTE — ASSESSMENT & PLAN NOTE
The patient is here for health maintenance visit.  Currently, the patient consumes a healthy diet and has an adequate exercise regimen.  Screening lab work is ordered.  Immunizations were reviewed today.  Advice and education was given regarding nutrition, aerobic exercise, routine dental evaluations, routine eye exams, reproductive health, cardiovascular risk reduction, sunscreen use, self skin examination (annual dermatology evaluations) and seatbelt use (general overall safety).  Further recommendations will be given if needed after lab evaluation.  Annual wellness evaluation is recommended.

## 2019-11-14 ENCOUNTER — TELEPHONE (OUTPATIENT)
Dept: FAMILY MEDICINE CLINIC | Facility: CLINIC | Age: 39
End: 2019-11-14

## 2019-11-15 ENCOUNTER — TELEPHONE (OUTPATIENT)
Dept: FAMILY MEDICINE CLINIC | Facility: CLINIC | Age: 39
End: 2019-11-15

## 2019-11-15 LAB — BACTERIA SPEC AEROBE CULT: ABNORMAL

## 2019-11-15 RX ORDER — NITROFURANTOIN 25; 75 MG/1; MG/1
100 CAPSULE ORAL EVERY 12 HOURS SCHEDULED
Qty: 14 CAPSULE | Refills: 0 | Status: SHIPPED | OUTPATIENT
Start: 2019-11-15 | End: 2019-11-22

## 2019-11-15 NOTE — TELEPHONE ENCOUNTER
Patient called wanting to discuss lab work with Dr. Rhoades's nurse. He stated he missed a call Wednesday and has been trying to return the call for 3 days now. I attempted to warm transfer to office but was advised to put a telephone encounter back. Patient stated that he would like to discuss results with someone today, was not keen on waiting til Monday. Transferred back to office.

## 2019-11-27 ENCOUNTER — TELEPHONE (OUTPATIENT)
Dept: FAMILY MEDICINE CLINIC | Facility: CLINIC | Age: 39
End: 2019-11-27

## 2019-11-27 NOTE — TELEPHONE ENCOUNTER
Pt has called in states he has finished antibiotics  But is still having the symptoms wanted to know what he needs to do since its  A long weekend and holiday     Confirmed Michelle

## 2019-11-27 NOTE — TELEPHONE ENCOUNTER
Called pt advised him to let the antibiotic complete it course. I f then he is still feeling the same symptoms just go to an urgent care ot come in to be seen.

## 2020-01-26 ENCOUNTER — OFFICE VISIT (OUTPATIENT)
Dept: FAMILY MEDICINE CLINIC | Facility: CLINIC | Age: 40
End: 2020-01-26

## 2020-01-26 VITALS
HEIGHT: 70 IN | SYSTOLIC BLOOD PRESSURE: 118 MMHG | WEIGHT: 181 LBS | BODY MASS INDEX: 25.91 KG/M2 | HEART RATE: 79 BPM | TEMPERATURE: 97.6 F | RESPIRATION RATE: 18 BRPM | OXYGEN SATURATION: 98 % | DIASTOLIC BLOOD PRESSURE: 64 MMHG

## 2020-01-26 DIAGNOSIS — Z78.9 SELF-CATHETERIZES URINARY BLADDER: ICD-10-CM

## 2020-01-26 DIAGNOSIS — R30.0 DYSURIA: Primary | ICD-10-CM

## 2020-01-26 DIAGNOSIS — N31.9 NEUROGENIC BLADDER: ICD-10-CM

## 2020-01-26 DIAGNOSIS — N39.0 RECURRENT UTI: ICD-10-CM

## 2020-01-26 LAB
BILIRUB BLD-MCNC: NEGATIVE MG/DL
CLARITY, POC: CLEAR
COLOR UR: YELLOW
GLUCOSE UR STRIP-MCNC: NEGATIVE MG/DL
KETONES UR QL: NEGATIVE
LEUKOCYTE EST, POC: NEGATIVE
NITRITE UR-MCNC: NEGATIVE MG/ML
PH UR: 5.5 [PH] (ref 5–8)
PROT UR STRIP-MCNC: NEGATIVE MG/DL
RBC # UR STRIP: NEGATIVE /UL
SP GR UR: 1.03 (ref 1–1.03)
UROBILINOGEN UR QL: NORMAL

## 2020-01-26 PROCEDURE — 99213 OFFICE O/P EST LOW 20 MIN: CPT | Performed by: NURSE PRACTITIONER

## 2020-01-26 PROCEDURE — 87086 URINE CULTURE/COLONY COUNT: CPT | Performed by: NURSE PRACTITIONER

## 2020-01-26 PROCEDURE — 81003 URINALYSIS AUTO W/O SCOPE: CPT | Performed by: NURSE PRACTITIONER

## 2020-01-26 RX ORDER — NITROFURANTOIN 25; 75 MG/1; MG/1
100 CAPSULE ORAL 2 TIMES DAILY
Qty: 14 CAPSULE | Refills: 0 | Status: SHIPPED | OUTPATIENT
Start: 2020-01-26 | End: 2020-02-02

## 2020-01-26 NOTE — PATIENT INSTRUCTIONS
Urinary Tract Infection, Adult    A urinary tract infection (UTI) is an infection of any part of the urinary tract. The urinary tract includes the kidneys, ureters, bladder, and urethra. These organs make, store, and get rid of urine in the body.  Your health care provider may use other names to describe the infection. An upper UTI affects the ureters and kidneys (pyelonephritis). A lower UTI affects the bladder (cystitis) and urethra (urethritis).  What are the causes?  Most urinary tract infections are caused by bacteria in your genital area, around the entrance to your urinary tract (urethra). These bacteria grow and cause inflammation of your urinary tract.  What increases the risk?  You are more likely to develop this condition if:  · You have a urinary catheter that stays in place (indwelling).  · You are not able to control when you urinate or have a bowel movement (you have incontinence).  · You are female and you:  ? Use a spermicide or diaphragm for birth control.  ? Have low estrogen levels.  ? Are pregnant.  · You have certain genes that increase your risk (genetics).  · You are sexually active.  · You take antibiotic medicines.  · You have a condition that causes your flow of urine to slow down, such as:  ? An enlarged prostate, if you are male.  ? Blockage in your urethra (stricture).  ? A kidney stone.  ? A nerve condition that affects your bladder control (neurogenic bladder).  ? Not getting enough to drink, or not urinating often.  · You have certain medical conditions, such as:  ? Diabetes.  ? A weak disease-fighting system (immunesystem).  ? Sickle cell disease.  ? Gout.  ? Spinal cord injury.  What are the signs or symptoms?  Symptoms of this condition include:  · Needing to urinate right away (urgently).  · Frequent urination or passing small amounts of urine frequently.  · Pain or burning with urination.  · Blood in the urine.  · Urine that smells bad or unusual.  · Trouble urinating.  · Cloudy  urine.  · Vaginal discharge, if you are female.  · Pain in the abdomen or the lower back.  You may also have:  · Vomiting or a decreased appetite.  · Confusion.  · Irritability or tiredness.  · A fever.  · Diarrhea.  The first symptom in older adults may be confusion. In some cases, they may not have any symptoms until the infection has worsened.  How is this diagnosed?  This condition is diagnosed based on your medical history and a physical exam. You may also have other tests, including:  · Urine tests.  · Blood tests.  · Tests for sexually transmitted infections (STIs).  If you have had more than one UTI, a cystoscopy or imaging studies may be done to determine the cause of the infections.  How is this treated?  Treatment for this condition includes:  · Antibiotic medicine.  · Over-the-counter medicines to treat discomfort.  · Drinking enough water to stay hydrated.  If you have frequent infections or have other conditions such as a kidney stone, you may need to see a health care provider who specializes in the urinary tract (urologist).  In rare cases, urinary tract infections can cause sepsis. Sepsis is a life-threatening condition that occurs when the body responds to an infection. Sepsis is treated in the hospital with IV antibiotics, fluids, and other medicines.  Follow these instructions at home:    Medicines  · Take over-the-counter and prescription medicines only as told by your health care provider.  · If you were prescribed an antibiotic medicine, take it as told by your health care provider. Do not stop using the antibiotic even if you start to feel better.  General instructions  · Make sure you:  ? Empty your bladder often and completely. Do not hold urine for long periods of time.  ? Empty your bladder after sex.  ? Wipe from front to back after a bowel movement if you are female. Use each tissue one time when you wipe.  · Drink enough fluid to keep your urine pale yellow.  · Keep all follow-up  visits as told by your health care provider. This is important.  Contact a health care provider if:  · Your symptoms do not get better after 1-2 days.  · Your symptoms go away and then return.  Get help right away if you have:  · Severe pain in your back or your lower abdomen.  · A fever.  · Nausea or vomiting.  Summary  · A urinary tract infection (UTI) is an infection of any part of the urinary tract, which includes the kidneys, ureters, bladder, and urethra.  · Most urinary tract infections are caused by bacteria in your genital area, around the entrance to your urinary tract (urethra).  · Treatment for this condition often includes antibiotic medicines.  · If you were prescribed an antibiotic medicine, take it as told by your health care provider. Do not stop using the antibiotic even if you start to feel better.  · Keep all follow-up visits as told by your health care provider. This is important.  This information is not intended to replace advice given to you by your health care provider. Make sure you discuss any questions you have with your health care provider.  Document Released: 09/27/2006 Document Revised: 06/27/2019 Document Reviewed: 06/27/2019  [employer] Interactive Patient Education © 2019 [employer]    Dysuria  Dysuria is pain or discomfort while urinating. The pain or discomfort may be felt in the part of your body that drains urine from the bladder (urethra) or in the surrounding tissue of the genitals. The pain may also be felt in the groin area, lower abdomen, or lower back. You may have to urinate frequently or have the sudden feeling that you have to urinate (urgency). Dysuria can affect both men and women, but it is more common in women.  Dysuria can be caused by many different things, including:  · Urinary tract infection.  · Kidney stones or bladder stones.  · Certain sexually transmitted infections (STIs), such as chlamydia.  · Dehydration.  · Inflammation of the tissues of the  vagina.  · Use of certain medicines.  · Use of certain soaps or scented products that cause irritation.  Follow these instructions at home:  General instructions  · Watch your condition for any changes.  · Urinate often. Avoid holding urine for long periods of time.  · After a bowel movement or urination, women should cleanse from front to back, using each tissue only once.  · Urinate after sexual intercourse.  · Keep all follow-up visits as told by your health care provider. This is important.  · If you had any tests done to find the cause of dysuria, it is up to you to get your test results. Ask your health care provider, or the department that is doing the test, when your results will be ready.  Eating and drinking    · Drink enough fluid to keep your urine pale yellow.  · Avoid caffeine, tea, and alcohol. They can irritate the bladder and make dysuria worse. In men, alcohol may irritate the prostate.  Medicines  · Take over-the-counter and prescription medicines only as told by your health care provider.  · If you were prescribed an antibiotic medicine, take it as told by your health care provider. Do not stop taking the antibiotic even if you start to feel better.  Contact a health care provider if:  · You have a fever.  · You develop pain in your back or sides.  · You have nausea or vomiting.  · You have blood in your urine.  · You are not urinating as often as you usually do.  Get help right away if:  · Your pain is severe and not relieved with medicines.  · You cannot eat or drink without vomiting.  · You are confused.  · You have a rapid heartbeat while at rest.  · You have shaking or chills.  · You feel extremely weak.  Summary  · Dysuria is pain or discomfort while urinating. Many different conditions can lead to dysuria.  · If you have dysuria, you may have to urinate frequently or have the sudden feeling that you have to urinate (urgency).  · Watch your condition for any changes. Keep all follow-up  visits as told by your health care provider.  · Make sure that you urinate often and drink enough fluid to keep your urine pale yellow.  This information is not intended to replace advice given to you by your health care provider. Make sure you discuss any questions you have with your health care provider.  Document Released: 09/15/2005 Document Revised: 10/04/2018 Document Reviewed: 10/04/2018  [employer] Interactive Patient Education © 2019 [employer]  Nitrofurantoin tablets or capsules  What is this medicine?  NITROFURANTOIN (divya HUTCHISON toyn) is an antibiotic. It is used to treat urinary tract infections.  This medicine may be used for other purposes; ask your health care provider or pharmacist if you have questions.  COMMON BRAND NAME(S): Macrobid, Macrodantin, Urotoin  What should I tell my health care provider before I take this medicine?  They need to know if you have any of these conditions:  -anemia  -diabetes  -glucose-6-phosphate dehydrogenase deficiency  -kidney disease  -liver disease  -lung disease  -other chronic illness  -an unusual or allergic reaction to nitrofurantoin, other antibiotics, other medicines, foods, dyes or preservatives  -pregnant or trying to get pregnant  -breast-feeding  How should I use this medicine?  Take this medicine by mouth with a glass of water. Follow the directions on the prescription label. Take this medicine with food or milk. Take your doses at regular intervals. Do not take your medicine more often than directed. Do not stop taking except on your doctor's advice.  Talk to your pediatrician regarding the use of this medicine in children. While this drug may be prescribed for selected conditions, precautions do apply.  Overdosage: If you think you have taken too much of this medicine contact a poison control center or emergency room at once.  NOTE: This medicine is only for you. Do not share this medicine with others.  What if I miss a dose?  If you miss a dose,  take it as soon as you can. If it is almost time for your next dose, take only that dose. Do not take double or extra doses.  What may interact with this medicine?  -antacids containing magnesium trisilicate  -probenecid  -quinolone antibiotics like ciprofloxacin, lomefloxacin, norfloxacin and ofloxacin  -sulfinpyrazone  This list may not describe all possible interactions. Give your health care provider a list of all the medicines, herbs, non-prescription drugs, or dietary supplements you use. Also tell them if you smoke, drink alcohol, or use illegal drugs. Some items may interact with your medicine.  What should I watch for while using this medicine?  Tell your doctor or health care professional if your symptoms do not improve or if you get new symptoms. Drink several glasses of water a day. If you are taking this medicine for a long time, visit your doctor for regular checks on your progress.  If you are diabetic, you may get a false positive result for sugar in your urine with certain brands of urine tests. Check with your doctor.  What side effects may I notice from receiving this medicine?  Side effects that you should report to your doctor or health care professional as soon as possible:  -allergic reactions like skin rash or hives, swelling of the face, lips, or tongue  -chest pain  -cough  -difficulty breathing  -dizziness, drowsiness  -fever or infection  -joint aches or pains  -pale or blue-tinted skin  -redness, blistering, peeling or loosening of the skin, including inside the mouth  -tingling, burning, pain, or numbness in hands or feet  -unusual bleeding or bruising  -unusually weak or tired  -yellowing of eyes or skin  Side effects that usually do not require medical attention (report to your doctor or health care professional if they continue or are bothersome):  -dark urine  -diarrhea  -headache  -loss of appetite  -nausea or vomiting  -temporary hair loss  This list may not describe all possible  side effects. Call your doctor for medical advice about side effects. You may report side effects to FDA at 3-955-KIF-5449.  Where should I keep my medicine?  Keep out of the reach of children.  Store at room temperature between 15 and 30 degrees C (59 and 86 degrees F). Protect from light. Throw away any unused medicine after the expiration date.  NOTE: This sheet is a summary. It may not cover all possible information. If you have questions about this medicine, talk to your doctor, pharmacist, or health care provider.  © 2019 [employer] (2009-07-08 15:56:47)

## 2020-01-26 NOTE — PROGRESS NOTES
Subjective   Licha Dooley is a 39 y.o. male.     Mr. Dooley presents today with c/o dysuria. Patient has a history of neurogenic bladder and self-catheterizes. He has been seeing  Urology for his condition, but states that it has been some time since his last appointment.    Difficulty Urinating   This is a new problem. The current episode started in the past 7 days. The problem occurs daily. The problem has been unchanged. Associated symptoms include urinary symptoms. Pertinent negatives include no abdominal pain, change in bowel habit, chills, diaphoresis, fatigue, fever, myalgias, nausea or vomiting. Nothing aggravates the symptoms. He has tried nothing for the symptoms.       The following portions of the patient's history were reviewed and updated as appropriate: allergies, current medications, past family history, past medical history, past social history, past surgical history and problem list.    Allergies as of 01/26/2020   • (No Known Allergies)       Current Outpatient Medications on File Prior to Visit   Medication Sig   • omeprazole (priLOSEC) 20 MG capsule Take 20 mg by mouth Daily.     No current facility-administered medications on file prior to visit.        Review of Systems   Constitutional: Negative for activity change, appetite change, chills, diaphoresis, fatigue and fever.   Respiratory: Negative.    Cardiovascular: Negative.    Gastrointestinal: Negative.  Negative for abdominal pain, change in bowel habit, diarrhea, nausea and vomiting.   Genitourinary: Positive for dysuria. Negative for flank pain, genital sores, hematuria, penile pain and testicular pain.   Musculoskeletal: Negative for myalgias.   Skin: Negative.        Objective   Physical Exam   Constitutional: He is oriented to person, place, and time. Vital signs are normal. He appears well-developed and well-nourished. He is cooperative.  Non-toxic appearance. He does not have a sickly appearance. He does not appear ill. No  distress.   HENT:   Right Ear: Tympanic membrane normal.   Left Ear: Tympanic membrane normal.   Mouth/Throat: Uvula is midline and mucous membranes are normal.   Cardiovascular: Normal rate, regular rhythm and normal heart sounds.   Pulmonary/Chest: Effort normal and breath sounds normal.   Abdominal: Soft. Normal appearance. He exhibits no distension and no mass. There is no hepatosplenomegaly. There is no tenderness. There is no rigidity, no rebound, no guarding, no CVA tenderness, no tenderness at McBurney's point and negative Fields's sign.   Neurological: He is alert and oriented to person, place, and time.   Skin: Skin is warm, dry and intact. No rash noted. He is not diaphoretic.   Psychiatric: He has a normal mood and affect. His behavior is normal. Judgment and thought content normal.   Vitals reviewed.    Vitals:    01/26/20 0925   BP: 118/64   Pulse: 79   Resp: 18   Temp: 97.6 °F (36.4 °C)   SpO2: 98%     Body mass index is 25.97 kg/m².    Assessment/Plan   Licha was seen today for urinary tract infection.    Diagnoses and all orders for this visit:    Dysuria  -     Urine Culture - Urine, Urine, Clean Catch    Recurrent UTI  -     POCT urinalysis dipstick, automated  -     nitrofurantoin, macrocrystal-monohydrate, (MACROBID) 100 MG capsule; Take 1 capsule by mouth 2 (Two) Times a Day for 7 days.       Brief Urine Lab Results  (Last result in the past 365 days)      Color   Clarity   Blood   Leuk Est   Nitrite   Protein   CREAT   Urine HCG        01/26/20 0944 Yellow Clear Negative Negative Negative Negative             Discussed the nature of the medical condition(s) risks, complications, implications, management, safe and proper use of medications. Encouraged medication compliance, and keeping scheduled follow up appointments with me and any other providers.     Laboratory testing ordered today. Further recommendations after lab evaluation.

## 2020-01-28 LAB — BACTERIA SPEC AEROBE CULT: NO GROWTH
